# Patient Record
Sex: FEMALE | Race: WHITE | NOT HISPANIC OR LATINO
[De-identification: names, ages, dates, MRNs, and addresses within clinical notes are randomized per-mention and may not be internally consistent; named-entity substitution may affect disease eponyms.]

---

## 2023-05-02 ENCOUNTER — APPOINTMENT (OUTPATIENT)
Dept: ORTHOPEDIC SURGERY | Facility: CLINIC | Age: 43
End: 2023-05-02
Payer: COMMERCIAL

## 2023-05-02 VITALS — BODY MASS INDEX: 18.78 KG/M2 | HEIGHT: 64 IN | WEIGHT: 110 LBS

## 2023-05-02 DIAGNOSIS — M70.52 OTHER BURSITIS OF KNEE, LEFT KNEE: ICD-10-CM

## 2023-05-02 PROBLEM — Z00.00 ENCOUNTER FOR PREVENTIVE HEALTH EXAMINATION: Status: ACTIVE | Noted: 2023-05-02

## 2023-05-02 PROCEDURE — 73564 X-RAY EXAM KNEE 4 OR MORE: CPT | Mod: RT

## 2023-05-02 PROCEDURE — 99204 OFFICE O/P NEW MOD 45 MIN: CPT

## 2023-05-02 NOTE — PHYSICAL EXAM
[5___] : hamstring 5[unfilled]/5 [Negative] : negative Manuel's [] : non-antalgic [Right] : right knee [All Views] : anteroposterior, lateral, skyline, and anteroposterior standing [There are no fractures, subluxations or dislocations. No significant abnormalities are seen] : There are no fractures, subluxations or dislocations. No significant abnormalities are seen [FreeTextEntry8] : ttp superior aspect of patella, quad tendon mild

## 2023-05-02 NOTE — HISTORY OF PRESENT ILLNESS
[Sudden] : sudden [8] : 8 [1] : 2 [Radiating] : radiating [Sharp] : sharp [Intermittent] : intermittent [Leisure] : leisure [Rest] : rest [de-identified] : Pt. is a 42 year old female who presents for evaluation of her RT knee. Denies trauma, but developed pain on 4/30/23, possible association with yoga class. No formal treatment to date. No previous injury/problem with RT knee. No locking or instability reported. Pain has improved. NSAIDS are helpful.  Initially had pain getting out of car.  [] : Post Surgical Visit: no [FreeTextEntry1] : right knee [FreeTextEntry3] : 4/30/23 [FreeTextEntry5] : No known injury. [de-identified] : activity/pressure

## 2023-05-02 NOTE — DISCUSSION/SUMMARY
[de-identified] : modify activities\par use elastic sleeve\par try OTC meds\par ice as needed\par try lido and voltaren gel